# Patient Record
Sex: FEMALE | Race: WHITE | URBAN - METROPOLITAN AREA
[De-identification: names, ages, dates, MRNs, and addresses within clinical notes are randomized per-mention and may not be internally consistent; named-entity substitution may affect disease eponyms.]

---

## 2017-06-27 DIAGNOSIS — Z30.41 USES ORAL CONTRACEPTION: ICD-10-CM

## 2017-06-27 DIAGNOSIS — Z01.419 ENCOUNTER FOR GYNECOLOGICAL EXAMINATION WITHOUT ABNORMAL FINDING: ICD-10-CM

## 2017-06-27 DIAGNOSIS — L70.9 ACNE, UNSPECIFIED ACNE TYPE: ICD-10-CM

## 2017-06-30 RX ORDER — SPIRONOLACTONE 25 MG/1
TABLET ORAL
Qty: 30 TABLET | Refills: 0 | Status: SHIPPED | OUTPATIENT
Start: 2017-06-30 | End: 2017-07-28

## 2017-06-30 RX ORDER — ACETAMINOPHEN AND CODEINE PHOSPHATE 120; 12 MG/5ML; MG/5ML
SOLUTION ORAL
Qty: 28 TABLET | Refills: 0 | Status: SHIPPED | OUTPATIENT
Start: 2017-06-30 | End: 2017-07-11

## 2017-07-11 ENCOUNTER — OFFICE VISIT (OUTPATIENT)
Dept: OBGYN | Facility: CLINIC | Age: 32
End: 2017-07-11
Payer: COMMERCIAL

## 2017-07-11 VITALS
DIASTOLIC BLOOD PRESSURE: 64 MMHG | HEIGHT: 63 IN | SYSTOLIC BLOOD PRESSURE: 102 MMHG | WEIGHT: 116 LBS | BODY MASS INDEX: 20.55 KG/M2

## 2017-07-11 DIAGNOSIS — Z79.899 ENCOUNTER FOR LONG-TERM (CURRENT) USE OF MEDICATIONS: ICD-10-CM

## 2017-07-11 DIAGNOSIS — Z01.419 ENCOUNTER FOR GYNECOLOGICAL EXAMINATION WITHOUT ABNORMAL FINDING: Primary | ICD-10-CM

## 2017-07-11 DIAGNOSIS — Z30.41 USES ORAL CONTRACEPTION: ICD-10-CM

## 2017-07-11 PROCEDURE — 36415 COLL VENOUS BLD VENIPUNCTURE: CPT | Performed by: NURSE PRACTITIONER

## 2017-07-11 PROCEDURE — 84132 ASSAY OF SERUM POTASSIUM: CPT | Performed by: NURSE PRACTITIONER

## 2017-07-11 PROCEDURE — 99395 PREV VISIT EST AGE 18-39: CPT | Performed by: NURSE PRACTITIONER

## 2017-07-11 PROCEDURE — G0145 SCR C/V CYTO,THINLAYER,RESCR: HCPCS | Performed by: NURSE PRACTITIONER

## 2017-07-11 RX ORDER — ACETAMINOPHEN AND CODEINE PHOSPHATE 120; 12 MG/5ML; MG/5ML
1 SOLUTION ORAL DAILY
Qty: 84 TABLET | Refills: 4 | Status: SHIPPED | OUTPATIENT
Start: 2017-07-11

## 2017-07-11 ASSESSMENT — ANXIETY QUESTIONNAIRES
IF YOU CHECKED OFF ANY PROBLEMS ON THIS QUESTIONNAIRE, HOW DIFFICULT HAVE THESE PROBLEMS MADE IT FOR YOU TO DO YOUR WORK, TAKE CARE OF THINGS AT HOME, OR GET ALONG WITH OTHER PEOPLE: NOT DIFFICULT AT ALL
3. WORRYING TOO MUCH ABOUT DIFFERENT THINGS: NOT AT ALL
7. FEELING AFRAID AS IF SOMETHING AWFUL MIGHT HAPPEN: NOT AT ALL
2. NOT BEING ABLE TO STOP OR CONTROL WORRYING: NOT AT ALL
5. BEING SO RESTLESS THAT IT IS HARD TO SIT STILL: NOT AT ALL
6. BECOMING EASILY ANNOYED OR IRRITABLE: NOT AT ALL
1. FEELING NERVOUS, ANXIOUS, OR ON EDGE: NOT AT ALL
GAD7 TOTAL SCORE: 0

## 2017-07-11 ASSESSMENT — PATIENT HEALTH QUESTIONNAIRE - PHQ9: 5. POOR APPETITE OR OVEREATING: NOT AT ALL

## 2017-07-11 NOTE — MR AVS SNAPSHOT
After Visit Summary   7/11/2017    Alise Hurt    MRN: [id number]           Patient Information     Date Of Birth          1985        Visit Information        Provider Department      7/11/2017 8:00 AM Germania Goetz APRN CNP [facility]        Today's Diagnoses     Encounter for gynecological examination without abnormal finding    -  1    Uses oral contraception        Encounter for long-term (current) use of medications           Follow-ups after your visit        Follow-up notes from your care team     Return in about 1 year (around 7/11/2018).      Who to contact     If you have questions or need follow up information about today's clinic visit or your schedule please contact [facility] directly at [phone].  Normal or non-critical lab and imaging results will be communicated to you by Theravaschart, letter or phone within 4 business days after the clinic has received the results. If you do not hear from us within 7 days, please contact the clinic through Theravaschart or phone. If you have a critical or abnormal lab result, we will notify you by phone as soon as possible.  Submit refill requests through Skytree or call your pharmacy and they will forward the refill request to us. Please allow 3 business days for your refill to be completed.          Additional Information About Your Visit        MyChart Information     Skytree gives you secure access to your electronic health record. If you see a primary care provider, you can also send messages to your care team and make appointments. If you have questions, please call your primary care clinic.  If you do not have a primary care provider, please call [phone] and they will assist you.        Care EveryWhere ID     This is your Care EveryWhere ID. This could be used by other organizations to access your [facility] medical records  [id number]        Your Vitals Were     Height Last Period BMI  "(Body Mass Index)             5' 2.75\" (1.594 m) 06/27/2017 (Approximate) 20.71 kg/m2          Blood Pressure from Last 3 Encounters:   07/11/17 102/64   04/28/16 98/62   04/01/15 98/66    Weight from Last 3 Encounters:   07/11/17 116 lb (52.6 kg)   04/28/16 115 lb (52.2 kg)   04/01/15 114 lb 6.4 oz (51.9 kg)              We Performed the Following     Pap imaged thin layer screen reflex to HPV if ASCUS - recommended age 25 - 29 years     Potassium          Today's Medication Changes          These changes are accurate as of: 7/11/17  8:59 AM.  If you have any questions, ask your nurse or doctor.               These medicines have changed or have updated prescriptions.        Dose/Directions    norethindrone 0.35 MG per tablet   Commonly known as:  MICRONOR   This may have changed:  See the new instructions.   Used for:  Uses oral contraception   Changed by:  Germania Goetz APRN CNP        Dose:  1 tablet   Take 1 tablet (0.35 mg) by mouth daily   Quantity:  84 tablet   Refills:  4            Where to get your medicines      These medications were sent to [facility] [address] [place] [address] [address] [address]     Phone:  [phone]     norethindrone 0.35 MG per tablet                Primary Care Provider    None Specified       No primary provider on file.        Equal Access to Services     [facility] AH: Abran Simpson, gucci calvert, qahugo kaalmahuang ochoa . So [facility] [phone].    ATENCIÓN: Si habla español, tiene a lazcano disposición servicios gratuitos de asistencia lingüística. Mariah al [phone].    We comply with applicable federal civil rights laws and [place] laws. We do not discriminate on the basis of race, color, national origin, age, disability sex, sexual orientation or gender identity.            Thank you!     Thank you for choosing [facility] for your care. Our goal is always to provide you with excellent care. Hearing back from our patients is one way we can continue to improve our services. Please take a few minutes to complete the written survey that you may receive in the mail after your visit with us. Thank you!             Your Updated Medication List - Protect others around you: Learn how to safely use, store and throw away your medicines at www.disposemymeds.org.          This list is accurate as of: 7/11/17  8:59 AM.  Always use your most recent med list.                   Brand Name Dispense Instructions for use Diagnosis    norethindrone 0.35 MG per tablet    MICRONOR    84 tablet    Take 1 tablet (0.35 mg) by mouth daily    Uses oral contraception       spironolactone 25 MG tablet    ALDACTONE    30 tablet    TAKE ONE TABLET BY MOUTH ONCE DAILY    Acne, unspecified acne type       SUMAtriptan 25 MG tablet    IMITREX    9 tablet    Take 1-2 tablets (25-50 mg) by mouth at onset of headache for migraine May repeat dose in 2 hours.  Do not exceed 200 mg in 24 hours    Migraine without aura, without mention of intractable migraine without mention of status migrainosus

## 2017-07-11 NOTE — PROGRESS NOTES
Alise is a 32 year old female who presents for annual exam.     Besides routine health maintenance,  she would like to discuss irregular periods and pill is not regulating cycles like it once did.    HPI:  Pt here today for her annual exam. She is feeling well. She is taking POP's but is not consistent with the time of day she takes them so she is having spotting.     Her other big struggle is dyspareunia. She has seen a specialist but was uncomfortable with the process. She has never used a tampon and pelvic exams are very uncomfortable for her.     Her last pap was 2-3 years ago, she has never had an abnormal.    The patient's primary care clinic is [facility] [place].      GYNECOLOGIC HISTORY:    Patient's last menstrual period was 2017 (approximate).  Her current contraception method is: oral contraceptives.  She  reports that she has been smoking.  She has never used smokeless tobacco.  Tobacco Cessation Action Plan: Information offered: Patient not interested at this time  Patient is sexually active.  STD testing offered?  Declined    Last PHQ-9 score on record =   PHQ-9 SCORE 2017   Total Score -   Total Score 0     Last GAD7 score on record =   JOSE-7 SCORE 2017   Total Score -   Total Score 0     Alcohol Score = 1    HEALTH MAINTENANCE:  Cholesterol:   Cholesterol   Date Value Ref Range Status   2014 163 <200 mg/dL Final     Comment:     LDL Cholesterol is the primary guide to therapy.   The NCEP recommends further evaluation of: patients with cholesterol greater   than 200 mg/dL if additional risk factors are present, cholesterol greater   than   240 mg/dL, triglycerides greater than 150 mg/dL, or HDL less than 40 mg/dL.   Last Mammo: N/A, Result: not applicable  Pap: per outside record   Negative  Colonoscopy:  N/A, Result: not applicable  Dexa:  Never  Health maintenance updated:  yes    HISTORY:  Obstetric History       T0      L0     SAB0   TAB0   Ectopic0    Multiple0   Live Births0           Patient Active Problem List   Diagnosis     Acne     BPPV (benign paroxysmal positional vertigo)     Family history of thyroid disease     Irritable bowel syndrome     Multiple pigmented nevi     JOSE (generalized anxiety disorder)     Vitamin D deficiency     Pain in joint, pelvic region and thigh     Migraine without aura     Past Surgical History:   Procedure Laterality Date     NO HISTORY OF SURGERY        Social History   Substance Use Topics     Smoking status: Current Every Day Smoker     Smokeless tobacco: Never Used     Alcohol use Yes      Comment: socially      Problem (# of Occurrences) Relation (Name,Age of Onset)    Alcohol/Drug (1) Mother    Blood Disease (1) Mother: hepatitis c    Breast Cancer (1) Other: great maternal grandmother    CANCER (2) Mother: cervical cancer, Maternal Grandmother: cervical cancer    Hypertension (1) Mother    Thyroid Disease (2) Mother, Maternal Grandmother       Negative family history of: Cancer - colorectal            Current Outpatient Prescriptions   Medication Sig     norethindrone (MICRONOR) 0.35 MG per tablet Take 1 tablet (0.35 mg) by mouth daily     spironolactone (ALDACTONE) 25 MG tablet TAKE ONE TABLET BY MOUTH ONCE DAILY     [DISCONTINUED] norethindrone (MICRONOR) 0.35 MG per tablet TAKE ONE TABLET BY MOUTH ONCE DAILY     SUMAtriptan (IMITREX) 25 MG tablet Take 1-2 tablets (25-50 mg) by mouth at onset of headache for migraine May repeat dose in 2 hours.  Do not exceed 200 mg in 24 hours (Patient not taking: Reported on 7/11/2017)     No current facility-administered medications for this visit.      Allergies   Allergen Reactions     Sulfa Drugs Hives       Past medical, surgical, social and family histories were reviewed and updated in EPIC.    ROS:   12 point review of systems negative other than symptoms noted below.  Constitutional: Fatigue  Respiratory: Shortness of Breath  Gastrointestinal: Bloating and  "Nausea  Genitourinary: Cramps, Irregular Menses and Painful [place]  Skin: Acne and Rash  Neurologic: Headaches    EXAM:  /64  Ht 5' 2.75\" (1.594 m)  Wt 116 lb (52.6 kg)  LMP 06/27/2017 (Approximate)  BMI 20.71 kg/m2   BMI: Body mass index is 20.71 kg/(m^2).    PHYSICAL EXAM:  Constitutional:  Appearance: Well nourished, well developed, alert, in no acute distress  Neck:  Lymph Nodes:  No lymphadenopathy present    Thyroid:  Gland size normal, nontender, no nodules or masses present  on palpation  Chest:  Respiratory Effort:  Breathing unlabored  Cardiovascular:    Heart: Auscultation:  Regular rate, normal rhythm, no murmurs present  Breasts: Inspection of Breasts:  No lymphadenopathy present    Palpation of Breasts and Axillae:  No masses present on palpation, no  breast tenderness    Axillary Lymph Nodes:  No lymphadenopathy present  Gastrointestinal:   Abdominal Examination:  Abdomen nontender to palpation, tone normal without rigidity or guarding, no masses present, umbilicus without lesions   Liver and Spleen:  No hepatomegaly present, liver nontender to palpation    Hernias:  No hernias present  Lymphatic: Lymph Nodes:  No other lymphadenopathy present  Skin:  General Inspection:  No rashes present, no lesions present, no areas of  discoloration    Genitalia and Groin:  No rashes present, no lesions present, no areas of  discoloration, no masses present  Neurologic/Psychiatric:    Mental Status:  Oriented X3     Pelvic Exam:  External Genitalia:     Normal appearance for age, no discharge present, no tenderness present, no inflammatory lesions present, color normal  Vagina:     Normal vaginal vault without central or paravaginal defects, no discharge present, no inflammatory lesions present, no masses present  Bladder:     Nontender to palpation  Urethra:   Urethral Body:  Urethra palpation normal, urethra structural support normal   Urethral Meatus:  No erythema or lesions present  Cervix:  "    Appearance FRIABLE, no lesions present, nontender to palpation, no bleeding present  Uterus:     Uterus: firm, normal sized and nontender, anteverted in position.   Adnexa:     No adnexal tenderness present, no adnexal masses present  Perineum:     Perineum within normal limits, no evidence of trauma, no rashes or skin lesions present  Anus:     Anus within normal limits, no hemorrhoids present  Inguinal Lymph Nodes:     No lymphadenopathy present  Pubic Hair:     Normal pubic hair distribution for age  Genitalia and Groin:     No rashes present, no lesions present, no areas of discoloration, no masses present    COUNSELING:   Special attention given to:        Regular exercise       Healthy diet/nutrition       Contraception    BMI: Body mass index is 20.71 kg/(m^2).      ASSESSMENT:  32 year old female with satisfactory annual exam.    ICD-10-CM    1. Encounter for gynecological examination without abnormal finding Z01.419 Pap imaged thin layer screen reflex to HPV if ASCUS - recommended age 25 - 29 years   2. Uses oral contraception Z30.41 norethindrone (MICRONOR) 0.35 MG per tablet   3. Encounter for long-term (current) use of medications Z79.899 Potassium       PLAN:  Annual pap done today, pelvic exam ok today, cervix friable. Ok to continue POP's, encouraged her to be on time to see if this eliminate the BTB. Will draw K+ for chronic spironolactone use.    SAMEER Yu CNP

## 2017-07-12 ASSESSMENT — ANXIETY QUESTIONNAIRES: GAD7 TOTAL SCORE: 0

## 2017-07-12 ASSESSMENT — PATIENT HEALTH QUESTIONNAIRE - PHQ9: SUM OF ALL RESPONSES TO PHQ QUESTIONS 1-9: 0

## 2017-07-13 LAB
COPATH REPORT: NORMAL
PAP: NORMAL
POTASSIUM SERPL-SCNC: 4.8 MMOL/L (ref 3.4–5.3)

## 2017-07-25 ENCOUNTER — MYC MEDICAL ADVICE (OUTPATIENT)
Dept: OBGYN | Facility: CLINIC | Age: 32
End: 2017-07-25

## 2017-07-25 NOTE — TELEPHONE ENCOUNTER
Pt's PAP smear from 7/11/17 is resulted. No letter has been sent out yet. Routing pt's [employer] message below to WENCESLAO Solo to review and advise.

## 2017-07-28 DIAGNOSIS — L70.9 ACNE, UNSPECIFIED ACNE TYPE: ICD-10-CM

## 2017-07-28 RX ORDER — SPIRONOLACTONE 25 MG/1
TABLET ORAL
Qty: 90 TABLET | Refills: 3 | Status: SHIPPED | OUTPATIENT
Start: 2017-07-28

## 2017-07-28 RX ORDER — SPIRONOLACTONE 25 MG/1
TABLET ORAL
Qty: 30 TABLET | Refills: 0 | Status: SHIPPED | OUTPATIENT
Start: 2017-07-28 | End: 2017-07-28

## 2017-07-28 NOTE — TELEPHONE ENCOUNTER
WENCESLAO Solo signed Rx today for 30 tabs/0rf as that was how it was pended by the pharmacy, however pt had her annual with WENCESLAO Solo on 7/11/17 and she gave verbal ok to send a yr's worth of the Rx to the pharmacy.  Called pharmacy and talked with Pharmacist (Gerry) and gave verbal to cancel the one month Rx sent today. She voiced understanding she would as pt has not picked Rx up yet.  Rx sent to pharmacy for Spironolactone (Aldactone) 25 MG, take 1 tablet (25 MG) by mouth once daily, 90 tabs/3rf.

## 2017-07-28 NOTE — TELEPHONE ENCOUNTER
Annual 7/11/17. Potassium 4.8. Routing to Germania Goetz. OK to send refill? Not refilled at annual

## 2017-07-28 NOTE — TELEPHONE ENCOUNTER
Pending Prescriptions:                       Disp   Refills    spironolactone (ALDACTONE) 25 MG tablet [P*30 tab*0        Sig: TAKE ONE TABLET BY MOUTH ONCE DAILY (DUE  FOR  ANNUAL           EXAM)    Last Written Prescription Date: 06/30/17  Last Fill Quantity: 30, # refills: 0  Last Office Visit with [facility], [facility] or [facility] prescribing provider: 07/11/17       Potassium   Date Value Ref Range Status   07/11/2017 4.8 3.4 - 5.3 mmol/L Final     Creatinine   Date Value Ref Range Status   03/12/2014 0.55 0.52 - 1.04 mg/dL Final     BP Readings from Last 3 Encounters:   07/11/17 102/64   04/28/16 98/62   04/01/15 98/66     Future visit: None

## 2017-11-20 DIAGNOSIS — Z01.419 ENCOUNTER FOR GYNECOLOGICAL EXAMINATION WITHOUT ABNORMAL FINDING: ICD-10-CM

## 2017-11-20 DIAGNOSIS — Z30.41 USES ORAL CONTRACEPTION: ICD-10-CM

## 2017-11-21 RX ORDER — ACETAMINOPHEN AND CODEINE PHOSPHATE 120; 12 MG/5ML; MG/5ML
SOLUTION ORAL
Qty: 84 TABLET | Refills: 3 | OUTPATIENT
Start: 2017-11-21

## 2017-11-21 NOTE — TELEPHONE ENCOUNTER
micronor 0.35      Last Written Prescription Date:  7/11/17  Last Fill Quantity: 84,   # refills: 4  Last Office Visit with [facility] primary care provider:  7/11/17  Future Office visit: none    Refill request too soon, Rx denied.

## 2019-01-22 ENCOUNTER — OFFICE VISIT (OUTPATIENT)
Dept: OBGYN | Facility: CLINIC | Age: 34
End: 2019-01-22
Payer: COMMERCIAL

## 2019-01-22 VITALS
WEIGHT: 121 LBS | BODY MASS INDEX: 21.44 KG/M2 | DIASTOLIC BLOOD PRESSURE: 64 MMHG | SYSTOLIC BLOOD PRESSURE: 120 MMHG | HEIGHT: 63 IN | HEART RATE: 70 BPM

## 2019-01-22 DIAGNOSIS — Z01.419 ENCOUNTER FOR GYNECOLOGICAL EXAMINATION WITHOUT ABNORMAL FINDING: Primary | ICD-10-CM

## 2019-01-22 PROCEDURE — 99395 PREV VISIT EST AGE 18-39: CPT | Performed by: NURSE PRACTITIONER

## 2019-01-22 RX ORDER — SERTRALINE HYDROCHLORIDE 25 MG/1
TABLET, FILM COATED ORAL
COMMUNITY
Start: 2019-01-09

## 2019-01-22 SDOH — HEALTH STABILITY: MENTAL HEALTH: HOW OFTEN DO YOU HAVE A DRINK CONTAINING ALCOHOL?: NEVER

## 2019-01-22 ASSESSMENT — ANXIETY QUESTIONNAIRES
1. FEELING NERVOUS, ANXIOUS, OR ON EDGE: NOT AT ALL
5. BEING SO RESTLESS THAT IT IS HARD TO SIT STILL: NOT AT ALL
GAD7 TOTAL SCORE: 0
3. WORRYING TOO MUCH ABOUT DIFFERENT THINGS: NOT AT ALL
7. FEELING AFRAID AS IF SOMETHING AWFUL MIGHT HAPPEN: NOT AT ALL
6. BECOMING EASILY ANNOYED OR IRRITABLE: NOT AT ALL
2. NOT BEING ABLE TO STOP OR CONTROL WORRYING: NOT AT ALL
IF YOU CHECKED OFF ANY PROBLEMS ON THIS QUESTIONNAIRE, HOW DIFFICULT HAVE THESE PROBLEMS MADE IT FOR YOU TO DO YOUR WORK, TAKE CARE OF THINGS AT HOME, OR GET ALONG WITH OTHER PEOPLE: NOT DIFFICULT AT ALL

## 2019-01-22 ASSESSMENT — PATIENT HEALTH QUESTIONNAIRE - PHQ9
SUM OF ALL RESPONSES TO PHQ QUESTIONS 1-9: 0
5. POOR APPETITE OR OVEREATING: NOT AT ALL

## 2019-01-22 ASSESSMENT — MIFFLIN-ST. JEOR: SCORE: 1227.98

## 2019-01-22 NOTE — PROGRESS NOTES
"  Alise is a 33 year old  female who presents for annual exam.     Besides routine health maintenance, she has no other health concerns today .    HPI:  The patient's PCP is Corie Caballero. Pt here today for her annual exam. She is feeling well.     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 She has a PCP up there but prefers to continue GYN care down here.     She still struggles with pelvic/vaginal pain with IC. Has not used a tampon ever. Has tried pelvic floor PT but it was \"uncomfortable socially\" for her.     Using POP's. Minimal BTB.       GYNECOLOGIC HISTORY:    Patient's last menstrual period was 2019 (exact date).  Her current contraception method is: oral contraceptives.  She  reports that she has been smoking.  She has been smoking about 0.50 packs per day. she has never used smokeless tobacco.  Tobacco Cessation Action Plan: Patient has patch.  Patient is sexually active.  STD testing offered?  Declined  Last PHQ-9 score on record =   PHQ-9 SCORE 2019   PHQ-9 Total Score -   PHQ-9 Total Score 0     Last GAD7 score on record =   JOSE-7 SCORE 2019   Total Score -   Total Score 0     Alcohol Score = 0    HEALTH MAINTENANCE:  Cholesterol:  14   Total= 163, Triglycerides=49, HDL=60, LDL=94, FBS=84, TSH=0.76  Last Mammo: Never, Result: not applicable, Next Mammo: Age 41  Pap:   Lab Results   Component Value Date    PAP NIL 2017    Colonoscopy:  Never, Result: not applicable  Dexa:  Never    Health maintenance updated:  yes    HISTORY:  Obstetric History       T0      L0     SAB0   TAB0   Ectopic0   Multiple0   Live Births0           Patient Active Problem List   Diagnosis     Acne     BPPV (benign paroxysmal positional vertigo)     Family history of thyroid disease     Irritable bowel syndrome     Multiple pigmented nevi     JOSE (generalized anxiety disorder)     Vitamin D deficiency     Pain in joint, pelvic region and thigh     " "Migraine without aura     Past Surgical History:   Procedure Laterality Date     NO HISTORY OF SURGERY        Social History     Tobacco Use     Smoking status: Current Every Day Smoker     Packs/day: 0.50     Smokeless tobacco: Never Used   Substance Use Topics     Alcohol use: No     Frequency: Never      Problem (# of Occurrences) Relation (Name,Age of Onset)    Alcohol/Drug (1) Mother    Blood Disease (1) Mother: hepatitis c    Breast Cancer (1) Other: great maternal grandmother    Cancer (2) Mother: cervical cancer, Maternal Grandmother: cervical cancer    Hypertension (1) Mother    Thyroid Disease (2) Mother, Maternal Grandmother       Negative family history of: Cancer - colorectal            Current Outpatient Medications   Medication Sig     norethindrone (MICRONOR) 0.35 MG per tablet Take 1 tablet (0.35 mg) by mouth daily     sertraline (ZOLOFT) 25 MG tablet      spironolactone (ALDACTONE) 25 MG tablet TAKE ONE TABLET (25 MG) BY MOUTH ONCE DAILY     SUMAtriptan (IMITREX) 25 MG tablet Take 1-2 tablets (25-50 mg) by mouth at onset of headache for migraine May repeat dose in 2 hours.  Do not exceed 200 mg in 24 hours (Patient not taking: Reported on 7/11/2017)     No current facility-administered medications for this visit.      Allergies   Allergen Reactions     Sulfasalazine Hives     Sulfa Drugs Hives and Rash       Past medical, surgical, social and family histories were reviewed and updated in EPIC.    ROS:   12 point review of systems negative other than symptoms noted below.  Gastrointestinal: Bloating  Genitourinary: Cramps and Spotting  Skin: Rash  Neurologic: Headaches    EXAM:  /64   Pulse 70   Ht 1.6 m (5' 3\")   Wt 54.9 kg (121 lb)   LMP 01/03/2019 (Exact Date)   BMI 21.43 kg/m     BMI: Body mass index is 21.43 kg/m .    PHYSICAL EXAM:  Constitutional:  Appearance: Well nourished, well developed, alert, in no acute distress  Neck:  Lymph Nodes:  No lymphadenopathy present    Thyroid:  " Gland size normal, nontender, no nodules or masses present  on palpation  Chest:  Respiratory Effort:  Breathing unlabored  Cardiovascular:    Heart: Auscultation:  Regular rate, normal rhythm, no murmurs present  Breasts: Inspection of Breasts:  No lymphadenopathy present., Palpation of Breasts and Axillae:  No masses present on palpation, no breast tenderness., Axillary Lymph Nodes:  No lymphadenopathy present. and No nodularity, asymmetry or nipple discharge bilaterally.  Gastrointestinal:   Abdominal Examination:  Abdomen nontender to palpation, tone normal without rigidity or guarding, no masses present, umbilicus without lesions   Liver and Spleen:  No hepatomegaly present, liver nontender to palpation    Hernias:  No hernias present  Lymphatic: Lymph Nodes:  No other lymphadenopathy present  Skin:  General Inspection:  No rashes present, no lesions present, no areas of  discoloration    Genitalia and Groin:  No rashes present, no lesions present, no areas of  discoloration, no masses present  Neurologic/Psychiatric:    Mental Status:  Oriented X3     Pelvic Exam:  External Genitalia:     Normal appearance for age, no discharge present, no tenderness present, no inflammatory lesions present, color normal  Vagina:     Normal vaginal vault without central or paravaginal defects, no discharge present, no inflammatory lesions present, no masses present-VERY SHORT   Bladder:     Nontender to palpation  Urethra:   Urethral Body:  Urethra palpation normal, urethra structural support normal   Urethral Meatus:  No erythema or lesions present  Cervix:     Appearance healthy, no lesions present, nontender to palpation, no bleeding present  Uterus:     Uterus: firm, normal sized and nontender, midplane in position.   Adnexa:     No adnexal tenderness present, no adnexal masses present  Perineum:     Perineum within normal limits, no evidence of trauma, no rashes or skin lesions present  Anus:     Anus within normal  limits, no hemorrhoids present  Inguinal Lymph Nodes:     No lymphadenopathy present  Pubic Hair:     Normal pubic hair distribution for age  Genitalia and Groin:     No rashes present, no lesions present, no areas of discoloration, no masses present      COUNSELING:   Special attention given to:        Regular exercise       Healthy diet/nutrition       Contraception    BMI: Body mass index is 21.43 kg/m .      ASSESSMENT:  33 year old female with satisfactory annual exam.    ICD-10-CM    1. Encounter for gynecological examination without abnormal finding Z01.419        PLAN:  Normal gyn exam. Pap up to date. Last pap in 2017, NIL. No hx of abnormal. Pap every 3 years. POP's and spironolactone refilled by PCP. Discussed using lube and using a penis ring to limit insertion with IC.    SAMEER Yu CNP

## 2019-01-23 ASSESSMENT — ANXIETY QUESTIONNAIRES: GAD7 TOTAL SCORE: 0

## 2022-01-15 NOTE — TELEPHONE ENCOUNTER
spironolactone (ALDACTONE) 25 MG tablet      Last Written Prescription Date:  4/28/17  Last Fill Quantity: 90,   # refills: 3  Last Office Visit with [facility] primary care provider:  4/28/16  Future Office visit: none    norethindrone (BERNARD) 0.35 MG per tablet    Last Written Prescription Date:  4/28/16  Last Fill Quantity: 90,   # refills: 3  Last Office Visit with [facility] primary care provider:  4/28/19  Future Office visit: none    Routing refill request to provider for review/approval because:  Refill sent for one month per [place] protocol. Pt does not have appointment      
No